# Patient Record
Sex: MALE | Race: WHITE | NOT HISPANIC OR LATINO
[De-identification: names, ages, dates, MRNs, and addresses within clinical notes are randomized per-mention and may not be internally consistent; named-entity substitution may affect disease eponyms.]

---

## 2020-05-08 ENCOUNTER — TRANSCRIPTION ENCOUNTER (OUTPATIENT)
Age: 75
End: 2020-05-08

## 2021-02-04 PROBLEM — Z00.00 ENCOUNTER FOR PREVENTIVE HEALTH EXAMINATION: Status: ACTIVE | Noted: 2021-02-04

## 2021-02-05 ENCOUNTER — APPOINTMENT (OUTPATIENT)
Dept: ORTHOPEDIC SURGERY | Facility: CLINIC | Age: 76
End: 2021-02-05
Payer: MEDICARE

## 2021-02-05 VITALS — BODY MASS INDEX: 25.76 KG/M2 | WEIGHT: 184 LBS | HEIGHT: 71 IN

## 2021-02-05 PROCEDURE — 99205 OFFICE O/P NEW HI 60 MIN: CPT

## 2021-03-16 ENCOUNTER — RESULT REVIEW (OUTPATIENT)
Age: 76
End: 2021-03-16

## 2021-03-16 ENCOUNTER — OUTPATIENT (OUTPATIENT)
Dept: OUTPATIENT SERVICES | Facility: HOSPITAL | Age: 76
LOS: 1 days | End: 2021-03-16
Payer: MEDICARE

## 2021-03-16 VITALS
DIASTOLIC BLOOD PRESSURE: 78 MMHG | HEIGHT: 71 IN | OXYGEN SATURATION: 99 % | WEIGHT: 182.1 LBS | HEART RATE: 75 BPM | TEMPERATURE: 98 F | RESPIRATION RATE: 16 BRPM | SYSTOLIC BLOOD PRESSURE: 134 MMHG

## 2021-03-16 DIAGNOSIS — Z29.9 ENCOUNTER FOR PROPHYLACTIC MEASURES, UNSPECIFIED: ICD-10-CM

## 2021-03-16 DIAGNOSIS — Z98.890 OTHER SPECIFIED POSTPROCEDURAL STATES: Chronic | ICD-10-CM

## 2021-03-16 DIAGNOSIS — M17.11 UNILATERAL PRIMARY OSTEOARTHRITIS, RIGHT KNEE: ICD-10-CM

## 2021-03-16 LAB
BLD GP AB SCN SERPL QL: NEGATIVE — SIGNIFICANT CHANGE UP
MRSA PCR RESULT.: SIGNIFICANT CHANGE UP
RH IG SCN BLD-IMP: POSITIVE — SIGNIFICANT CHANGE UP
S AUREUS DNA NOSE QL NAA+PROBE: SIGNIFICANT CHANGE UP

## 2021-03-16 PROCEDURE — 73700 CT LOWER EXTREMITY W/O DYE: CPT

## 2021-03-16 PROCEDURE — 87641 MR-STAPH DNA AMP PROBE: CPT

## 2021-03-16 PROCEDURE — 86901 BLOOD TYPING SEROLOGIC RH(D): CPT

## 2021-03-16 PROCEDURE — 87640 STAPH A DNA AMP PROBE: CPT

## 2021-03-16 PROCEDURE — 86900 BLOOD TYPING SEROLOGIC ABO: CPT

## 2021-03-16 PROCEDURE — G1004: CPT

## 2021-03-16 PROCEDURE — 86850 RBC ANTIBODY SCREEN: CPT

## 2021-03-16 PROCEDURE — 73700 CT LOWER EXTREMITY W/O DYE: CPT | Mod: 26,RT,MF

## 2021-03-16 PROCEDURE — G0463: CPT

## 2021-03-16 RX ORDER — CHLORHEXIDINE GLUCONATE 213 G/1000ML
1 SOLUTION TOPICAL ONCE
Refills: 0 | Status: DISCONTINUED | OUTPATIENT
Start: 2021-03-30 | End: 2021-03-30

## 2021-03-16 RX ORDER — CEFAZOLIN SODIUM 1 G
2000 VIAL (EA) INJECTION ONCE
Refills: 0 | Status: DISCONTINUED | OUTPATIENT
Start: 2021-03-30 | End: 2021-03-30

## 2021-03-16 RX ORDER — LIDOCAINE HCL 20 MG/ML
0.2 VIAL (ML) INJECTION ONCE
Refills: 0 | Status: DISCONTINUED | OUTPATIENT
Start: 2021-03-30 | End: 2021-04-01

## 2021-03-16 NOTE — H&P PST ADULT - ASSESSMENT
DINHI VTE 2.0 SCORE [CLOT updated 2019]    AGE RELATED RISK FACTORS                                                       MOBILITY RELATED FACTORS  [ ] Age 41-60 years                                            (1 Point)                    [ ] Bed rest                                                        (1 Point)  [ ] Age: 61-74 years                                           (2 Points)                  [ ] Plaster cast                                                   (2 Points)  [ x] Age= 75 years                                              (3 Points)                    [ ] Bed bound for more than 72 hours                 (2 Points)    DISEASE RELATED RISK FACTORS                                               GENDER SPECIFIC FACTORS  [ ] Edema in the lower extremities                       (1 Point)              [ ] Pregnancy                                                     (1 Point)  [ ] Varicose veins                                               (1 Point)                     [ ] Post-partum < 6 weeks                                   (1 Point)             [ ] BMI > 25 Kg/m2                                            (1 Point)                     [ ] Hormonal therapy  or oral contraception          (1 Point)                 [ ] Sepsis (in the previous month)                        (1 Point)               [ ] History of pregnancy complications                 (1 point)  [ ] Pneumonia or serious lung disease                                               [ ] Unexplained or recurrent                     (1 Point)           (in the previous month)                               (1 Point)  [ ] Abnormal pulmonary function test                     (1 Point)                 SURGERY RELATED RISK FACTORS  [ ] Acute myocardial infarction                              (1 Point)               [ ]  Section                                             (1 Point)  [ ] Congestive heart failure (in the previous month)  (1 Point)      [ ] Minor surgery                                                  (1 Point)   [ ] Inflammatory bowel disease                             (1 Point)               [ ] Arthroscopic surgery                                        (2 Points)  [ ] Central venous access                                      (2 Points)                [ ] General surgery lasting more than 45 minutes (2 points)  [ ] Malignancy- Present or previous                   (2 Points)                [ x] Elective arthroplasty                                         (5 points)    [ ] Stroke (in the previous month)                          (5 Points)                                                                                                                                                           HEMATOLOGY RELATED FACTORS                                                 TRAUMA RELATED RISK FACTORS  [ ] Prior episodes of VTE                                     (3 Points)                [ ] Fracture of the hip, pelvis, or leg                       (5 Points)  [ ] Positive family history for VTE                         (3 Points)             [ ] Acute spinal cord injury (in the previous month)  (5 Points)  [ ] Prothrombin 72126 A                                     (3 Points)               [ ] Paralysis  (less than 1 month)                             (5 Points)  [ ] Factor V Leiden                                             (3 Points)                  [ ] Multiple Trauma within 1 month                        (5 Points)  [ ] Lupus anticoagulants                                     (3 Points)                                                           [ ] Anticardiolipin antibodies                               (3 Points)                                                       [ ] High homocysteine in the blood                      (3 Points)                                             [ ] Other congenital or acquired thrombophilia      (3 Points)                                                [ ] Heparin induced thrombocytopenia                  (3 Points)                                     Total Score [    8      ]

## 2021-03-16 NOTE — H&P PST ADULT - PRIMARY CARE PROVIDER
Eusebio Randolph  [phone] preop visit 3/9/2021 Eusebio Randolph  [phone] preop visit 3/9/2021 final note pending

## 2021-03-16 NOTE — H&P PST ADULT - NSICDXPASTSURGICALHX_GEN_ALL_CORE_FT
PAST SURGICAL HISTORY:  History of arthroscopy of shoulder right    History of tonsillectomy and adenoidectomy

## 2021-03-16 NOTE — H&P PST ADULT - NSICDXPROBLEM_GEN_ALL_CORE_FT
PROBLEM DIAGNOSES  Problem: Primary osteoarthritis of right knee  Assessment and Plan: right TKR tommie robot   PST instructions provided, surgical scrub given, patient verbalized understanding.   CBC, BMP, HgA1c done by PCP note and EKG on chart /MMF   Tommie CT  done today   T&S, MRSA collected and sent   Covid PCR 3/27/2021 @ [facility]     Problem: Need for prophylactic measure  Assessment and Plan: The Caprini score indicates that this patient is at high risk for a VTE event (score 6 or greater). Surgical patients in this group will benefit from both pharmacologic prophylaxis and intermittent compression devices.  The surgical team will determine the balance between VTE risk and bleeding risk, and other clinical considerations

## 2021-03-16 NOTE — H&P PST ADULT - HISTORY OF PRESENT ILLNESS
75 yr old male, poor historian,  with HTN, HLD, primary osteoarthritis of right knee, Patient reports "bone on bone" knee , worsening pain and difficulty walking.  Patient presents to PST for scheduled right TKR with Carlos robot on 3/30/2021. Denies fever, chills , no acute complaints. Denies sick contacts. Covid PCR 3/27/2021 @ [facility] . Accompanied by wife. Ambulating without assistance.     "patient's goal is to walk without pain after surgery "

## 2021-03-16 NOTE — H&P PST ADULT - NSICDXPASTMEDICALHX_GEN_ALL_CORE_FT
PAST MEDICAL HISTORY:  History of gout     Primary osteoarthritis of right knee      PAST MEDICAL HISTORY:  History of gout     Hyperlipidemia     Hypertension     Primary osteoarthritis of right knee     Seborrheic keratosis

## 2021-03-16 NOTE — H&P PST ADULT - OTHER CARE PROVIDERS
cardio Dr. Read [phone] , had ECHO 3/10/2021  ( prior cardiologist Dr. Wilbur Villareal [phone]  )

## 2021-03-23 PROBLEM — Z87.39 PERSONAL HISTORY OF OTHER DISEASES OF THE MUSCULOSKELETAL SYSTEM AND CONNECTIVE TISSUE: Chronic | Status: ACTIVE | Noted: 2021-03-16

## 2021-03-23 PROBLEM — E78.5 HYPERLIPIDEMIA, UNSPECIFIED: Chronic | Status: ACTIVE | Noted: 2021-03-16

## 2021-03-23 PROBLEM — M17.11 UNILATERAL PRIMARY OSTEOARTHRITIS, RIGHT KNEE: Chronic | Status: ACTIVE | Noted: 2021-03-16

## 2021-03-23 PROBLEM — I10 ESSENTIAL (PRIMARY) HYPERTENSION: Chronic | Status: ACTIVE | Noted: 2021-03-16

## 2021-03-23 PROBLEM — L82.1 OTHER SEBORRHEIC KERATOSIS: Chronic | Status: ACTIVE | Noted: 2021-03-16

## 2021-03-27 ENCOUNTER — OUTPATIENT (OUTPATIENT)
Dept: OUTPATIENT SERVICES | Facility: HOSPITAL | Age: 76
LOS: 1 days | End: 2021-03-27

## 2021-03-27 DIAGNOSIS — Z11.52 ENCOUNTER FOR SCREENING FOR COVID-19: ICD-10-CM

## 2021-03-27 DIAGNOSIS — Z98.890 OTHER SPECIFIED POSTPROCEDURAL STATES: Chronic | ICD-10-CM

## 2021-03-27 LAB — SARS-COV-2 RNA SPEC QL NAA+PROBE: SIGNIFICANT CHANGE UP

## 2021-03-29 ENCOUNTER — TRANSCRIPTION ENCOUNTER (OUTPATIENT)
Age: 76
End: 2021-03-29

## 2021-03-30 ENCOUNTER — APPOINTMENT (OUTPATIENT)
Dept: ORTHOPEDIC SURGERY | Facility: HOSPITAL | Age: 76
End: 2021-03-30

## 2021-03-30 ENCOUNTER — TRANSCRIPTION ENCOUNTER (OUTPATIENT)
Age: 76
End: 2021-03-30

## 2021-03-30 ENCOUNTER — INPATIENT (INPATIENT)
Facility: HOSPITAL | Age: 76
LOS: 1 days | Discharge: HOME HEALTH | DRG: 470 | End: 2021-04-01
Attending: ORTHOPAEDIC SURGERY | Admitting: ORTHOPAEDIC SURGERY
Payer: MEDICARE

## 2021-03-30 VITALS
WEIGHT: 182.1 LBS | DIASTOLIC BLOOD PRESSURE: 80 MMHG | HEART RATE: 63 BPM | RESPIRATION RATE: 18 BRPM | OXYGEN SATURATION: 99 % | TEMPERATURE: 97 F | HEIGHT: 71 IN | SYSTOLIC BLOOD PRESSURE: 150 MMHG

## 2021-03-30 DIAGNOSIS — M17.11 UNILATERAL PRIMARY OSTEOARTHRITIS, RIGHT KNEE: ICD-10-CM

## 2021-03-30 DIAGNOSIS — Z98.890 OTHER SPECIFIED POSTPROCEDURAL STATES: Chronic | ICD-10-CM

## 2021-03-30 LAB — GLUCOSE BLDC GLUCOMTR-MCNC: 83 MG/DL — SIGNIFICANT CHANGE UP (ref 70–99)

## 2021-03-30 PROCEDURE — 0055T BONE SRGRY CMPTR CT/MRI IMAG: CPT

## 2021-03-30 PROCEDURE — 27447 TOTAL KNEE ARTHROPLASTY: CPT | Mod: RT

## 2021-03-30 PROCEDURE — 20985 CPTR-ASST DIR MS PX: CPT

## 2021-03-30 PROCEDURE — 73560 X-RAY EXAM OF KNEE 1 OR 2: CPT | Mod: 26,RT

## 2021-03-30 RX ORDER — PANTOPRAZOLE SODIUM 20 MG/1
1 TABLET, DELAYED RELEASE ORAL
Qty: 0 | Refills: 0 | DISCHARGE
Start: 2021-03-30

## 2021-03-30 RX ORDER — SODIUM CHLORIDE 9 MG/ML
1000 INJECTION, SOLUTION INTRAVENOUS
Refills: 0 | Status: DISCONTINUED | OUTPATIENT
Start: 2021-03-30 | End: 2021-04-01

## 2021-03-30 RX ORDER — DEXAMETHASONE 0.5 MG/5ML
8 ELIXIR ORAL ONCE
Refills: 0 | Status: COMPLETED | OUTPATIENT
Start: 2021-03-31 | End: 2021-03-31

## 2021-03-30 RX ORDER — SODIUM CHLORIDE 9 MG/ML
3 INJECTION INTRAMUSCULAR; INTRAVENOUS; SUBCUTANEOUS EVERY 8 HOURS
Refills: 0 | Status: DISCONTINUED | OUTPATIENT
Start: 2021-03-30 | End: 2021-03-30

## 2021-03-30 RX ORDER — KETOROLAC TROMETHAMINE 30 MG/ML
15 SYRINGE (ML) INJECTION EVERY 6 HOURS
Refills: 0 | Status: DISCONTINUED | OUTPATIENT
Start: 2021-03-30 | End: 2021-03-31

## 2021-03-30 RX ORDER — OXYCODONE HYDROCHLORIDE 5 MG/1
10 TABLET ORAL
Refills: 0 | Status: DISCONTINUED | OUTPATIENT
Start: 2021-03-30 | End: 2021-04-01

## 2021-03-30 RX ORDER — FLUTICASONE PROPIONATE 50 MCG
1 SPRAY, SUSPENSION NASAL
Qty: 0 | Refills: 0 | DISCHARGE

## 2021-03-30 RX ORDER — ATORVASTATIN CALCIUM 80 MG/1
1 TABLET, FILM COATED ORAL
Qty: 0 | Refills: 0 | DISCHARGE

## 2021-03-30 RX ORDER — TRAMADOL HYDROCHLORIDE 50 MG/1
50 TABLET ORAL EVERY 6 HOURS
Refills: 0 | Status: DISCONTINUED | OUTPATIENT
Start: 2021-03-30 | End: 2021-04-01

## 2021-03-30 RX ORDER — ACETAMINOPHEN 500 MG
3 TABLET ORAL
Qty: 0 | Refills: 0 | DISCHARGE
Start: 2021-03-30

## 2021-03-30 RX ORDER — FLUTICASONE PROPIONATE 50 MCG
1 SPRAY, SUSPENSION NASAL
Refills: 0 | Status: DISCONTINUED | OUTPATIENT
Start: 2021-03-30 | End: 2021-04-01

## 2021-03-30 RX ORDER — ASPIRIN/CALCIUM CARB/MAGNESIUM 324 MG
1 TABLET ORAL
Qty: 0 | Refills: 0 | DISCHARGE
Start: 2021-03-30

## 2021-03-30 RX ORDER — SODIUM CHLORIDE 9 MG/ML
500 INJECTION, SOLUTION INTRAVENOUS ONCE
Refills: 0 | Status: COMPLETED | OUTPATIENT
Start: 2021-03-30 | End: 2021-03-31

## 2021-03-30 RX ORDER — CEFAZOLIN SODIUM 1 G
2000 VIAL (EA) INJECTION EVERY 8 HOURS
Refills: 0 | Status: COMPLETED | OUTPATIENT
Start: 2021-03-30 | End: 2021-03-31

## 2021-03-30 RX ORDER — ASPIRIN/CALCIUM CARB/MAGNESIUM 324 MG
325 TABLET ORAL
Refills: 0 | Status: DISCONTINUED | OUTPATIENT
Start: 2021-03-30 | End: 2021-04-01

## 2021-03-30 RX ORDER — LISINOPRIL 2.5 MG/1
10 TABLET ORAL DAILY
Refills: 0 | Status: DISCONTINUED | OUTPATIENT
Start: 2021-03-30 | End: 2021-04-01

## 2021-03-30 RX ORDER — ACETAMINOPHEN 500 MG
1000 TABLET ORAL ONCE
Refills: 0 | Status: COMPLETED | OUTPATIENT
Start: 2021-03-30 | End: 2021-03-30

## 2021-03-30 RX ORDER — MAGNESIUM HYDROXIDE 400 MG/1
30 TABLET, CHEWABLE ORAL DAILY
Refills: 0 | Status: DISCONTINUED | OUTPATIENT
Start: 2021-03-30 | End: 2021-04-01

## 2021-03-30 RX ORDER — PANTOPRAZOLE SODIUM 20 MG/1
40 TABLET, DELAYED RELEASE ORAL
Refills: 0 | Status: DISCONTINUED | OUTPATIENT
Start: 2021-03-30 | End: 2021-04-01

## 2021-03-30 RX ORDER — FENTANYL CITRATE 50 UG/ML
25 INJECTION INTRAVENOUS
Refills: 0 | Status: DISCONTINUED | OUTPATIENT
Start: 2021-03-30 | End: 2021-03-30

## 2021-03-30 RX ORDER — ONDANSETRON 8 MG/1
4 TABLET, FILM COATED ORAL EVERY 6 HOURS
Refills: 0 | Status: DISCONTINUED | OUTPATIENT
Start: 2021-03-30 | End: 2021-04-01

## 2021-03-30 RX ORDER — PANTOPRAZOLE SODIUM 20 MG/1
40 TABLET, DELAYED RELEASE ORAL ONCE
Refills: 0 | Status: COMPLETED | OUTPATIENT
Start: 2021-03-30 | End: 2021-03-30

## 2021-03-30 RX ORDER — HYDROMORPHONE HYDROCHLORIDE 2 MG/ML
0.5 INJECTION INTRAMUSCULAR; INTRAVENOUS; SUBCUTANEOUS ONCE
Refills: 0 | Status: DISCONTINUED | OUTPATIENT
Start: 2021-03-30 | End: 2021-04-01

## 2021-03-30 RX ORDER — TRAMADOL HYDROCHLORIDE 50 MG/1
50 TABLET ORAL ONCE
Refills: 0 | Status: DISCONTINUED | OUTPATIENT
Start: 2021-03-30 | End: 2021-03-30

## 2021-03-30 RX ORDER — ONDANSETRON 8 MG/1
4 TABLET, FILM COATED ORAL ONCE
Refills: 0 | Status: DISCONTINUED | OUTPATIENT
Start: 2021-03-30 | End: 2021-03-30

## 2021-03-30 RX ORDER — QUINAPRIL HYDROCHLORIDE 40 MG/1
1 TABLET, FILM COATED ORAL
Qty: 0 | Refills: 0 | DISCHARGE

## 2021-03-30 RX ORDER — OXYCODONE HYDROCHLORIDE 5 MG/1
5 TABLET ORAL
Refills: 0 | Status: DISCONTINUED | OUTPATIENT
Start: 2021-03-30 | End: 2021-04-01

## 2021-03-30 RX ORDER — ACETAMINOPHEN 500 MG
975 TABLET ORAL EVERY 8 HOURS
Refills: 0 | Status: DISCONTINUED | OUTPATIENT
Start: 2021-03-31 | End: 2021-04-01

## 2021-03-30 RX ORDER — SODIUM CHLORIDE 9 MG/ML
500 INJECTION, SOLUTION INTRAVENOUS ONCE
Refills: 0 | Status: COMPLETED | OUTPATIENT
Start: 2021-03-30 | End: 2021-03-30

## 2021-03-30 RX ORDER — ATORVASTATIN CALCIUM 80 MG/1
40 TABLET, FILM COATED ORAL AT BEDTIME
Refills: 0 | Status: DISCONTINUED | OUTPATIENT
Start: 2021-03-30 | End: 2021-04-01

## 2021-03-30 RX ADMIN — Medication 150 MILLIGRAM(S): at 11:34

## 2021-03-30 RX ADMIN — Medication 100 MILLIGRAM(S): at 19:49

## 2021-03-30 RX ADMIN — FENTANYL CITRATE 25 MICROGRAM(S): 50 INJECTION INTRAVENOUS at 16:45

## 2021-03-30 RX ADMIN — SODIUM CHLORIDE 100 MILLILITER(S): 9 INJECTION, SOLUTION INTRAVENOUS at 16:13

## 2021-03-30 RX ADMIN — TRAMADOL HYDROCHLORIDE 50 MILLIGRAM(S): 50 TABLET ORAL at 11:34

## 2021-03-30 RX ADMIN — Medication 1000 MILLIGRAM(S): at 20:20

## 2021-03-30 RX ADMIN — Medication 15 MILLIGRAM(S): at 19:49

## 2021-03-30 RX ADMIN — Medication 400 MILLIGRAM(S): at 19:49

## 2021-03-30 RX ADMIN — ATORVASTATIN CALCIUM 40 MILLIGRAM(S): 80 TABLET, FILM COATED ORAL at 23:08

## 2021-03-30 RX ADMIN — PANTOPRAZOLE SODIUM 40 MILLIGRAM(S): 20 TABLET, DELAYED RELEASE ORAL at 11:35

## 2021-03-30 RX ADMIN — Medication 325 MILLIGRAM(S): at 17:37

## 2021-03-30 RX ADMIN — Medication 15 MILLIGRAM(S): at 20:20

## 2021-03-30 RX ADMIN — FENTANYL CITRATE 25 MICROGRAM(S): 50 INJECTION INTRAVENOUS at 16:30

## 2021-03-30 RX ADMIN — SODIUM CHLORIDE 3 MILLILITER(S): 9 INJECTION INTRAMUSCULAR; INTRAVENOUS; SUBCUTANEOUS at 11:35

## 2021-03-30 RX ADMIN — SODIUM CHLORIDE 500 MILLILITER(S): 9 INJECTION, SOLUTION INTRAVENOUS at 18:28

## 2021-03-30 RX ADMIN — Medication 1 SPRAY(S): at 23:08

## 2021-03-30 RX ADMIN — SODIUM CHLORIDE 500 MILLILITER(S): 9 INJECTION, SOLUTION INTRAVENOUS at 15:39

## 2021-03-30 NOTE — PHYSICAL THERAPY INITIAL EVALUATION ADULT - ADDITIONAL COMMENTS
pt uncertain historian. pt reports he lives with spouse in a private home with steps to enter and a flight of steps inside (pt uncertain how many steps and if he has a railing). pt denies ambulating with a device and/or owning any devices prior to admission. pt uncertain historian. spouse reports pt lives with her in a private home with 6 steps to enter (+handrail) and a flight of steps inside (+handrail). pt was independent with ADLs and functional mobility prior to admission. pt does not own any DME.

## 2021-03-30 NOTE — DISCHARGE NOTE PROVIDER - NSDCFUADDAPPT_GEN_ALL_CORE_FT
Follow up with Dr Vargas in 2 WEEKS for dressing REMOVAL, wound check, and staple/suture removal (if applicable)   Follow up with your private internist / PMD in 4-6 weeks re: general checkup (and possible medication adjustment  Please call for an appointment

## 2021-03-30 NOTE — CHART NOTE - NSCHARTNOTEFT_GEN_A_CORE
POC    Subjective:  Patient seen resting in bed in the PACU. Notes the anesthetic block is wearing off and he is starting to feel some knee pain, currently rating it a 4/10. Denies chest pain, shortness of breath, dizziness, lightheadedness, nausea, or vomiting.    Objective:    PE:  HEENT: responsive, in no acute distress  Cardio: normal S1S2, normal rate and rhythm  Pulm: non-labored breathing, clear breath sounds bilaterally at lung apices  Abdomen: soft and non-tender, (+) bowel sounds x4  Extremities:  RLE:  -knee dressing clean, dry, and intact  -left SCD in place, calves soft and non-tender bilaterally  -motor strength and senses intact bilaterally  -DF/PF 5/5  -EHL/FHL 5/5  -DP and PT pulses +2 bilaterally    Vital Signs Last 24 Hrs  T(F): 97.2 (30 Mar 2021 16:00)  HR: 72 (30 Mar 2021 16:30)   BP: 133/73 (30 Mar 2021 16:30)   RR: 16 (30 Mar 2021 16:30)   SpO2: 95% (30 Mar 2021 16:30)     Post-op Imaging: R Knee x-ray  IMPRESSION:  Unconstrained right total knee prosthesis implanted.  Intact and aligned hardware and no periprosthetic fractures.  Postoperative soft tissue changes.    Assessment:  Patient is a 76 y/o male s/p right TKA.    Plan:  -RLE/LLE weight-bearing as tolerated, PT evaluation tomorrow  -pain control IV Tylenol 1000mg/100ml, IV Fentanyl 25mcg every 5 minutes prn, oxycodone IR 5mg for moderate 10mg for severe every 3 hours prn  -no Celebrex 2/2 sulfa allergy  -DVT prophylaxis Aspirin 325mg BID, LLE SCD  -follow up AM labs - CBC, BMP  -discharge PACU to floor    TIFFANI Noel  Ortho Pager [phone]

## 2021-03-30 NOTE — DISCHARGE NOTE PROVIDER - NSDCFUADDINST_GEN_ALL_CORE_FT
1. Keep Aquacel dressing clean and dry   1a. ice to affected incision every 4-6 hours x 72 hours   1b. elevate affected extremity when @ rest   1c.Range of motion exercises encouraged   2. Continue ECOTRIN 325 mg by mouth 2x / day (at breakfast and at dinner) for a total of 6WEEKS   3. sponge bath only until OK w/ Surgeon  Call MD for excessive pain, persistent fevers, pain NOT relieved by medications.  Do not drive or lift any heavy objects       COVID REMINDER: You are being discharged from the hospital. Please keep in mind that the CORONAVIRUS is still in our community.   So, try to restrict activities outside of your home except for getting medical care and simple errands [until seen by your Surgeon]. Call ahead before visiting your doctor.  Wear a facemask when you are outside and around other people. Cover your cough and sneezes.  Clean your hands often.  Try to avoid sharing personal household items.  Clean all frequently touched surfaces daily.  1. Keep Aquacel dressing clean and dry   1a. ice to affected incision every 4-6 hours x 72 hours   1b. elevate affected extremity when @ rest   1c.Range of motion exercises encouraged .  Weight bearing as tolerated right lower extremity.  2. Continue ECOTRIN 325 mg by mouth 2x / day (at breakfast and at dinner) for a total of 6WEEKS   3. sponge bath only until OK w/ Surgeon  Call MD for excessive pain, persistent fevers, pain NOT relieved by medications.  Do not drive or lift any heavy objects       COVID REMINDER: You are being discharged from the hospital. Please keep in mind that the CORONAVIRUS is still in our community.   So, try to restrict activities outside of your home except for getting medical care and simple errands [until seen by your Surgeon]. Call ahead before visiting your doctor.  Wear a facemask when you are outside and around other people. Cover your cough and sneezes.  Clean your hands often.  Try to avoid sharing personal household items.  Clean all frequently touched surfaces daily.

## 2021-03-30 NOTE — PATIENT PROFILE ADULT - MONEY FOR FOOD
Detail Level: Zone Topical Steroids Counseling: I discussed with the patient that prolonged use of topical steroids can result in the increased appearance of superficial blood vessels (telangiectasias), lightening (hypopigmentation) and thinning of the skin (atrophy).  Patient understands to avoid using high potency steroids in skin folds, the groin or the face.  The patient verbalized understanding of the proper use and possible adverse effects of topical steroids.  All of the patient's questions and concerns were addressed. no

## 2021-03-30 NOTE — DISCHARGE NOTE PROVIDER - CARE PROVIDER_API CALL
Rosalva Vargas)  Orthopaedic Surgery  [address], [address]  [place] [address]  Phone: [phone]  Fax: [phone]  Follow Up Time:

## 2021-03-30 NOTE — DISCHARGE NOTE PROVIDER - NSDCMRMEDTOKEN_GEN_ALL_CORE_FT
acetaminophen 325 mg oral tablet: 3 tab(s) orally every 8 hours x ONE (1) WEEK, then as needed   aspirin 325 mg oral tablet: 1 tab(s) orally 2 times a day x 6 WEEKS Total (blood thinner) then STOP   atorvastatin 40 mg oral tablet: 1 tab(s) orally once a day  Flonase 50 mcg/inh nasal spray: 1 spray(s) nasal once a day  Multiple Vitamins oral tablet: 1 tab(s) orally once a day  pantoprazole 40 mg oral delayed release tablet: 1 tab(s) orally once a day (before a meal)  quinapril 10 mg oral tablet: 1 tab(s) orally once a day   3-in-1 commode: Dx: s/p Right TKA  GOMEZ: 99 months  acetaminophen 325 mg oral tablet: 3 tab(s) orally every 8 hours x ONE (1) WEEK, then as needed for mild pain  (Over the counter)  aspirin 325 mg oral tablet: 1 tab(s) orally 2 times a day x 6 WEEKS Total (blood thinner) then STOP   atorvastatin 40 mg oral tablet: 1 tab(s) orally once a day  Flonase 50 mcg/inh nasal spray: 1 spray(s) nasal once a day  Multiple Vitamins oral tablet: 1 tab(s) orally once a day  oxyCODONE 5 mg oral tablet: 1 tab(s) orally every 4 to 6 hours, As Needed -Severe Pain MDD:6  pantoprazole 40 mg oral delayed release tablet: 1 tab(s) orally once a day (before a meal)  quinapril 10 mg oral tablet: 1 tab(s) orally once a day  Rolling Walker: Dx: s/p Right TKA  GOMEZ: 99 months  traMADol 50 mg oral tablet: 1 tab(s) orally every 6 hours, As needed, Moderate Pain MDD:4

## 2021-03-30 NOTE — DISCHARGE NOTE PROVIDER - HOSPITAL COURSE
History of Present Illness:  History of Present Illness    75 yr old male, poor historian,  with HTN, HLD, primary osteoarthritis of right knee, Patient reports "bone on bone" knee , worsening pain and difficulty walking.  Patient presents to PST for scheduled right TKR with Carlos robot on 3/30/2021. Denies fever, chills , no acute complaints. Denies sick contacts. Covid PCR 3/27/2021 @ [facility] . Accompanied by wife. Ambulating without assistance.     "patient's goal is to walk without pain after surgery "     Allergies/Medications:   Allergies:        Allergies:  	sulfa drugs: Drug Category, Anaphylaxis    Home Medications:   * Patient Currently Takes Medications as of 16-Mar-2021 10:25 documented in Structured Notes  · 	atorvastatin 40 mg oral tablet: Last Dose Taken:  , 1 tab(s) orally once a day  · 	quinapril 10 mg oral tablet: Last Dose Taken:  , 1 tab(s) orally once a day  · 	Flonase 50 mcg/inh nasal spray: Last Dose Taken:  , 1 spray(s) nasal once a day    PMH/PSH/FH/SH:    Past Medical, Past Surgical, and Family History:  PAST MEDICAL HISTORY:  History of gout     Hyperlipidemia     Hypertension     Primary osteoarthritis of right knee     Seborrheic keratosis.     PAST SURGICAL HISTORY:  History of arthroscopy of shoulder right    History of tonsillectomy and adenoidectomy.      Hospital Course:   3/30:  Pt admitted and underwent L TKA ( robot-assisted) w/ Dr Vargas. No complications noted. Eval by by PT:  WBAT RLE  3/31:             GOC: " Walk with out knee pain after surgery"  History of Present Illness:  History of Present Illness    75 yr old male, poor historian,  with HTN, HLD, primary osteoarthritis of right knee, Patient reports "bone on bone" knee , worsening pain and difficulty walking.  Patient presents to PST for scheduled right TKR with Carlos robot on 3/30/2021. Denies fever, chills , no acute complaints. Denies sick contacts. Covid PCR 3/27/2021 @ [facility] . Accompanied by wife. Ambulating without assistance.     "patient's goal is to walk without pain after surgery "     Allergies/Medications:   Allergies:        Allergies:  	sulfa drugs: Drug Category, Anaphylaxis    Home Medications:   * Patient Currently Takes Medications as of 16-Mar-2021 10:25 documented in Structured Notes  · 	atorvastatin 40 mg oral tablet: Last Dose Taken:  , 1 tab(s) orally once a day  · 	quinapril 10 mg oral tablet: Last Dose Taken:  , 1 tab(s) orally once a day  · 	Flonase 50 mcg/inh nasal spray: Last Dose Taken:  , 1 spray(s) nasal once a day    PMH/PSH/FH/SH:    Past Medical, Past Surgical, and Family History:  PAST MEDICAL HISTORY:  History of gout   Hyperlipidemia   Hypertension   Primary osteoarthritis of right knee   Seborrheic keratosis.     PAST SURGICAL HISTORY:  History of arthroscopy of shoulder right  History of tonsillectomy and adenoidectomy.    Hospital Course:   3/30:  Pt admitted and underwent L TKA ( robot-assisted) w/ Dr Vargas. No complications noted.   3/31:  Evaluated by PT, PT, recommended for subacute rehab.  Remain of stay unremarkable, and patient discharged to Subacute rehab.    GOC: " Walk with out knee pain after surgery"  History of Present Illness:  History of Present Illness    75 yr old male, poor historian,  with HTN, HLD, primary osteoarthritis of right knee, Patient reports "bone on bone" knee , worsening pain and difficulty walking.  Patient presents to PST for scheduled right TKR with Carlos robot on 3/30/2021. Denies fever, chills , no acute complaints. Denies sick contacts. Covid PCR 3/27/2021 @ [facility] . Accompanied by wife. Ambulating without assistance.     "patient's goal is to walk without pain after surgery "     Allergies/Medications:   Allergies:        Allergies:  	sulfa drugs: Drug Category, Anaphylaxis    Home Medications:   * Patient Currently Takes Medications as of 16-Mar-2021 10:25 documented in Structured Notes  · 	atorvastatin 40 mg oral tablet: Last Dose Taken:  , 1 tab(s) orally once a day  · 	quinapril 10 mg oral tablet: Last Dose Taken:  , 1 tab(s) orally once a day  · 	Flonase 50 mcg/inh nasal spray: Last Dose Taken:  , 1 spray(s) nasal once a day    PMH/PSH/FH/SH:    Past Medical, Past Surgical, and Family History:  PAST MEDICAL HISTORY:  History of gout   Hyperlipidemia   Hypertension   Primary osteoarthritis of right knee   Seborrheic keratosis.     PAST SURGICAL HISTORY:  History of arthroscopy of shoulder right  History of tonsillectomy and adenoidectomy.    Hospital Course:   3/30:  Pt admitted and underwent L TKA ( robot-assisted) w/ Dr Vargas. No complications noted.   4/1:  Evaluated by PT, PT, recommended home with home PT.  Remain of stay unremarkable, and patient discharged to Home.    Patient received Covid19 vaccine on this admission on 4/1/2021. Remainder of hospital stay unremarkable    GOC: " Walk with out knee pain after surgery"  History of Present Illness:  History of Present Illness    75 yr old male, poor historian,  with HTN, HLD, primary osteoarthritis of right knee, Patient reports "bone on bone" knee , worsening pain and difficulty walking.  Patient presents to PST for scheduled right TKR with Carlos robot on 3/30/2021. Denies fever, chills , no acute complaints. Denies sick contacts. Covid PCR 3/27/2021 @ [facility] . Accompanied by wife. Ambulating without assistance.     "patient's goal is to walk without pain after surgery "     Allergies/Medications:   Allergies:        Allergies:  	sulfa drugs: Drug Category, Anaphylaxis    Home Medications:   * Patient Currently Takes Medications as of 16-Mar-2021 10:25 documented in Structured Notes  · 	atorvastatin 40 mg oral tablet: Last Dose Taken:  , 1 tab(s) orally once a day  · 	quinapril 10 mg oral tablet: Last Dose Taken:  , 1 tab(s) orally once a day  · 	Flonase 50 mcg/inh nasal spray: Last Dose Taken:  , 1 spray(s) nasal once a day    PMH/PSH/FH/SH:    Past Medical, Past Surgical, and Family History:  PAST MEDICAL HISTORY:  History of gout   Hyperlipidemia   Hypertension   Primary osteoarthritis of right knee   Seborrheic keratosis.     PAST SURGICAL HISTORY:  History of arthroscopy of shoulder right  History of tonsillectomy and adenoidectomy.    Hospital Course:   3/30:  Pt admitted and underwent L TKA ( robot-assisted) w/ Dr Vargas. No complications noted.   4/1:  Evaluated by PT, PT, recommended home with home PT.  Remain of stay unremarkable, and patient discharged to Home.    Patient received Covid19 vaccine on this admission on 4/1/2021. Remainder of hospital stay unremarkable  Follow up Dr Vargas in office                          14.3   13.64 )-----------( 279      ( 31 Mar 2021 06:45 )             42.9       GOC: " Walk with out knee pain after surgery"

## 2021-03-30 NOTE — PHYSICAL THERAPY INITIAL EVALUATION ADULT - BALANCE DISTURBANCE, SYSTEM IMPAIRMENT CONTRIBUTE, REHAB EVAL
[address], [place]  Authorization for Invasive Procedures  1.  I hereby authorize Lexi Garcia, my physician and whomever may be designated as the doctor's assistant, to perform the following operation and/or procedure:  Cardiac ca 4. Should the need arise during my operation or immediate post-operative period; I also consent to the administration of blood and/or blood products.  Further, I understand that despite careful testing and screening of blood and blood products, I may still 9. Patients having a sterilization procedure: I understand that if the procedure is successful the results will be permanent and it will therefore be impossible for me to inseminate, conceive or bear children.  I also understand that the procedure is intend musculoskeletal

## 2021-03-30 NOTE — PHYSICAL THERAPY INITIAL EVALUATION ADULT - GENERAL OBSERVATIONS, REHAB EVAL
pt nallely 60 min eval fair. pt rec'd in bed, peripheral IV line, premedicated, spouse at bedside, NAD. pt agreed to session. pt appeared Stebbins. pt A&Ox4, but spouse reports h/o short term memory loss. orthostatics assessed (see vitals).

## 2021-03-30 NOTE — PHYSICAL THERAPY INITIAL EVALUATION ADULT - PERTINENT HX OF CURRENT PROBLEM, REHAB EVAL
76 y/o M with h/o HTN, HLD, OA of R knee, now p/w worsening pain and difficulty walking. pt now presents s/p R TKR.

## 2021-03-31 ENCOUNTER — TRANSCRIPTION ENCOUNTER (OUTPATIENT)
Age: 76
End: 2021-03-31

## 2021-03-31 DIAGNOSIS — Z96.651 PRESENCE OF RIGHT ARTIFICIAL KNEE JOINT: ICD-10-CM

## 2021-03-31 LAB
ANION GAP SERPL CALC-SCNC: 12 MMOL/L — SIGNIFICANT CHANGE UP (ref 5–17)
BUN SERPL-MCNC: 20 MG/DL — SIGNIFICANT CHANGE UP (ref 7–23)
CALCIUM SERPL-MCNC: 8.4 MG/DL — SIGNIFICANT CHANGE UP (ref 8.4–10.5)
CHLORIDE SERPL-SCNC: 107 MMOL/L — SIGNIFICANT CHANGE UP (ref 96–108)
CO2 SERPL-SCNC: 21 MMOL/L — LOW (ref 22–31)
COVID-19 SPIKE DOMAIN AB INTERP: NEGATIVE — SIGNIFICANT CHANGE UP
COVID-19 SPIKE DOMAIN ANTIBODY RESULT: 0.4 U/ML — SIGNIFICANT CHANGE UP
CREAT SERPL-MCNC: 1.06 MG/DL — SIGNIFICANT CHANGE UP (ref 0.5–1.3)
GLUCOSE SERPL-MCNC: 152 MG/DL — HIGH (ref 70–99)
HCT VFR BLD CALC: 42.9 % — SIGNIFICANT CHANGE UP (ref 39–50)
HGB BLD-MCNC: 14.3 G/DL — SIGNIFICANT CHANGE UP (ref 13–17)
MCHC RBC-ENTMCNC: 30 PG — SIGNIFICANT CHANGE UP (ref 27–34)
MCHC RBC-ENTMCNC: 33.3 GM/DL — SIGNIFICANT CHANGE UP (ref 32–36)
MCV RBC AUTO: 90.1 FL — SIGNIFICANT CHANGE UP (ref 80–100)
NRBC # BLD: 0 /100 WBCS — SIGNIFICANT CHANGE UP (ref 0–0)
PLATELET # BLD AUTO: 279 K/UL — SIGNIFICANT CHANGE UP (ref 150–400)
POTASSIUM SERPL-MCNC: 4.2 MMOL/L — SIGNIFICANT CHANGE UP (ref 3.5–5.3)
POTASSIUM SERPL-SCNC: 4.2 MMOL/L — SIGNIFICANT CHANGE UP (ref 3.5–5.3)
RBC # BLD: 4.76 M/UL — SIGNIFICANT CHANGE UP (ref 4.2–5.8)
RBC # FLD: 12.6 % — SIGNIFICANT CHANGE UP (ref 10.3–14.5)
SARS-COV-2 IGG+IGM SERPL QL IA: 0.4 U/ML — SIGNIFICANT CHANGE UP
SARS-COV-2 IGG+IGM SERPL QL IA: NEGATIVE — SIGNIFICANT CHANGE UP
SODIUM SERPL-SCNC: 140 MMOL/L — SIGNIFICANT CHANGE UP (ref 135–145)
WBC # BLD: 13.64 K/UL — HIGH (ref 3.8–10.5)
WBC # FLD AUTO: 13.64 K/UL — HIGH (ref 3.8–10.5)

## 2021-03-31 PROCEDURE — 99231 SBSQ HOSP IP/OBS SF/LOW 25: CPT

## 2021-03-31 RX ADMIN — Medication 975 MILLIGRAM(S): at 05:26

## 2021-03-31 RX ADMIN — PANTOPRAZOLE SODIUM 40 MILLIGRAM(S): 20 TABLET, DELAYED RELEASE ORAL at 05:25

## 2021-03-31 RX ADMIN — Medication 975 MILLIGRAM(S): at 05:56

## 2021-03-31 RX ADMIN — Medication 15 MILLIGRAM(S): at 02:15

## 2021-03-31 RX ADMIN — Medication 101.6 MILLIGRAM(S): at 05:26

## 2021-03-31 RX ADMIN — LISINOPRIL 10 MILLIGRAM(S): 2.5 TABLET ORAL at 05:25

## 2021-03-31 RX ADMIN — Medication 15 MILLIGRAM(S): at 01:58

## 2021-03-31 RX ADMIN — Medication 15 MILLIGRAM(S): at 13:25

## 2021-03-31 RX ADMIN — Medication 975 MILLIGRAM(S): at 21:50

## 2021-03-31 RX ADMIN — Medication 100 MILLIGRAM(S): at 04:37

## 2021-03-31 RX ADMIN — Medication 1 TABLET(S): at 13:25

## 2021-03-31 RX ADMIN — ATORVASTATIN CALCIUM 40 MILLIGRAM(S): 80 TABLET, FILM COATED ORAL at 21:20

## 2021-03-31 RX ADMIN — Medication 1 SPRAY(S): at 05:25

## 2021-03-31 RX ADMIN — Medication 325 MILLIGRAM(S): at 17:33

## 2021-03-31 RX ADMIN — Medication 15 MILLIGRAM(S): at 08:06

## 2021-03-31 RX ADMIN — Medication 15 MILLIGRAM(S): at 07:36

## 2021-03-31 RX ADMIN — Medication 975 MILLIGRAM(S): at 21:20

## 2021-03-31 RX ADMIN — Medication 325 MILLIGRAM(S): at 05:25

## 2021-03-31 RX ADMIN — SODIUM CHLORIDE 500 MILLILITER(S): 9 INJECTION, SOLUTION INTRAVENOUS at 05:31

## 2021-03-31 NOTE — PROGRESS NOTE ADULT - ASSESSMENT
Assessment:  Patient is a 76 y/o male POD#1 for right total knee replacement.    Plan:  -RLE weight-bearing as tolerated, PT today  -DVT prophylaxis Aspirin 325mg BID  -Pain management Tylenol 975mg q8, Tordol 15mg q6, Tramadol 60mg q6 for mild pain, Oxycodone 6mg q3 for moderate pain, Oxycodone 10mg q3 for severe pain (although sulfa allergy, no issue with Advil in the past, ok to continue Tordol)  -Follow up AM labs - CBC, BMP  -Discharge planning TBD      EFRAIN Noel-S  Ortho Pager [phone]   Assessment:  Patient is a 76 y/o male POD#1 for right total knee replacement, recovering well without complaints.

## 2021-03-31 NOTE — OCCUPATIONAL THERAPY INITIAL EVALUATION ADULT - IMPAIRED TRANSFERS: SIT/STAND, REHAB EVAL
impaired balance/impaired coordination/decreased strength impaired balance/cognition/impaired coordination/pain/decreased strength

## 2021-03-31 NOTE — OCCUPATIONAL THERAPY INITIAL EVALUATION ADULT - PHYSICAL ASSIST/NONPHYSICAL ASSIST: TOILET, REHAB EVAL
cues for use of grab bars/verbal cues cues for use of grab bars/verbal cues/nonverbal cues (demo/gestures)/1 person assist

## 2021-03-31 NOTE — OCCUPATIONAL THERAPY INITIAL EVALUATION ADULT - PERTINENT HX OF CURRENT PROBLEM, REHAB EVAL
76 yo M, poor historian, with primary osteoarthritis of right knee, Patient reports "bone on bone" knee , worsening pain and difficulty walking.  Patient presents to PST for scheduled right TKR with Carlos robot on 3/30/2021. Denies fever, chills , no acute complaints. (see below)

## 2021-03-31 NOTE — OCCUPATIONAL THERAPY INITIAL EVALUATION ADULT - PLANNED THERAPY INTERVENTIONS, OT EVAL
ADL retraining/balance training/transfer training ADL retraining/balance training/bed mobility training/transfer training

## 2021-03-31 NOTE — PROGRESS NOTE ADULT - SUBJECTIVE AND OBJECTIVE BOX
Subjective:  Patient seen resting comfortably in bed with ice over knee. States knee pain is about the same as yesterday and has not improved. Denies chest pain, shortness of breath, nausea, or vomiting.    Objective:  PE:  HEENT: responsive, in no acute distress  RLE:  -knee Aquacel dressing clean, dry, and intact  -bilateral calves soft and non-tender, left SCD in place  -motor and sensation intact bilaterally  -DF/PF 5/5 bilaterally  -EHL/FHL 5/5 bilaterally  -DP pulse +2 bilaterally    Vital Signs Last 24 Hrs  T(C): 36.4 (31 Mar 2021 04:35),   HR: 100 (31 Mar 2021 04:35)   BP: 133/77 (31 Mar 2021 04:35)   RR: 16 (31 Mar 2021 04:35)  SpO2: 98% (31 Mar 2021 04:35)     Subjective:  Patient seen resting comfortably in bed with ice over knee. States knee pain is about the same as yesterday (4/10) and has not improved. Denies chest pain, shortness of breath, nausea, or vomiting.    Objective:  PE:  HEENT: Awake/alert resting in bed,, in no acute distress  RLE:  -knee Aquacel dressing clean, dry, and intact, Soft/compressible compartments  -bilateral calves soft and non-tender, left SCD in place  -sensation intact bilaterally  -DF/PF 5/5 bilaterally  -EHL/FHL 5/5 bilaterally  -DP pulse +2 bilaterally    Vital Signs Last 24 Hrs  T(C): 36.4 (31 Mar 2021 04:35),   HR: 100 (31 Mar 2021 04:35)   BP: 133/77 (31 Mar 2021 04:35)   RR: 16 (31 Mar 2021 04:35)  SpO2: 98% (31 Mar 2021 04:35)

## 2021-03-31 NOTE — OCCUPATIONAL THERAPY INITIAL EVALUATION ADULT - ANTICIPATED DISCHARGE DISPOSITION, OT EVAL
[facility]LL[facility]/rehabilitation facility Home OT to assess safety, ADLs and functional mobility. Supervision assist for all functional activities. Wife able to provide assist/home w/ OT/rehabilitation facility

## 2021-03-31 NOTE — OCCUPATIONAL THERAPY INITIAL EVALUATION ADULT - ADDITIONAL COMMENTS
(continued from above) X-ray R Knee (3/30): Unconstrained right total knee prosthesis implanted. Intact and aligned hardware and no periprosthetic fractures.  CT R Knee (3/16): Moderate medial compartment arthrosis of the right knee.

## 2021-03-31 NOTE — OCCUPATIONAL THERAPY INITIAL EVALUATION ADULT - MUSCLE TONE ASSESSMENT, REHAB EVAL
BUE and LLE 3+/5, unable to assess RLE secondary to TKR/bilateral upper extremities/bilateral lower extremities

## 2021-03-31 NOTE — PROGRESS NOTE ADULT - PROBLEM SELECTOR PLAN 1
Plan:  -RLE weight-bearing as tolerated, PT today  -DVT prophylaxis Aspirin 325mg BID  -Pain management Tylenol 975mg q8, Tordol 15mg q6, Tramadol 60mg q6 for mild pain, Oxycodone 6mg q3 for moderate pain, Oxycodone 10mg q3 for severe pain (although sulfa allergy, no issue with Advil in the past -continue Tordol)  -Follow up AM labs - CBC, BMP  -Discharge planning TBD      SILVIO NoelS  Ortho Pager [phone]    I have reviewed the student's note, examined the patient, and changed the note where applicable, and I agree with the above note.    BALBINA Galvez PA-C  #[id number]

## 2021-03-31 NOTE — OCCUPATIONAL THERAPY INITIAL EVALUATION ADULT - PHYSICAL ASSIST/NONPHYSICAL ASSIST: SIT/STAND, REHAB EVAL
verbal cues for hand/foot placement/verbal cues verbal cues for hand/foot placement/verbal cues/nonverbal cues (demo/gestures)/1 person assist

## 2021-03-31 NOTE — OCCUPATIONAL THERAPY INITIAL EVALUATION ADULT - RANGE OF MOTION EXAMINATION, LOWER EXTREMITY
Left LE Active ROM was WFL (within functional limits)/Right LE Active ROM was WFL   (within functional limits) 96.7

## 2021-03-31 NOTE — OCCUPATIONAL THERAPY INITIAL EVALUATION ADULT - DIAGNOSIS, OT EVAL
Patient presents with deficits in strength, balance, coordination and endurance impacting ADL participation and functional mobility Patient presents with deficits in strength, balance, coordination, cognition and endurance impacting ADL participation and functional mobility

## 2021-03-31 NOTE — DISCHARGE NOTE NURSING/CASE MANAGEMENT/SOCIAL WORK - PATIENT PORTAL LINK FT
You can access the FollowMyHealth Patient Portal offered by [facility] by registering at the following website: [email or web address]. By joining [facility]’s FollowMyHealth portal, you will also be able to view your health information using other applications (apps) compatible with our system.

## 2021-04-01 VITALS
RESPIRATION RATE: 16 BRPM | DIASTOLIC BLOOD PRESSURE: 67 MMHG | OXYGEN SATURATION: 99 % | SYSTOLIC BLOOD PRESSURE: 110 MMHG | HEART RATE: 88 BPM | TEMPERATURE: 98 F

## 2021-04-01 PROCEDURE — U0003: CPT

## 2021-04-01 PROCEDURE — 94640 AIRWAY INHALATION TREATMENT: CPT

## 2021-04-01 PROCEDURE — 86769 SARS-COV-2 COVID-19 ANTIBODY: CPT

## 2021-04-01 PROCEDURE — 82962 GLUCOSE BLOOD TEST: CPT

## 2021-04-01 PROCEDURE — 80048 BASIC METABOLIC PNL TOTAL CA: CPT

## 2021-04-01 PROCEDURE — 97530 THERAPEUTIC ACTIVITIES: CPT

## 2021-04-01 PROCEDURE — 0031A: CPT

## 2021-04-01 PROCEDURE — S2900: CPT

## 2021-04-01 PROCEDURE — 85027 COMPLETE CBC AUTOMATED: CPT

## 2021-04-01 PROCEDURE — 97535 SELF CARE MNGMENT TRAINING: CPT

## 2021-04-01 PROCEDURE — 97116 GAIT TRAINING THERAPY: CPT

## 2021-04-01 PROCEDURE — U0005: CPT

## 2021-04-01 PROCEDURE — 73560 X-RAY EXAM OF KNEE 1 OR 2: CPT

## 2021-04-01 PROCEDURE — 97166 OT EVAL MOD COMPLEX 45 MIN: CPT

## 2021-04-01 PROCEDURE — C1776: CPT

## 2021-04-01 PROCEDURE — 97161 PT EVAL LOW COMPLEX 20 MIN: CPT

## 2021-04-01 PROCEDURE — C1713: CPT

## 2021-04-01 PROCEDURE — C9803: CPT

## 2021-04-01 RX ORDER — OXYCODONE HYDROCHLORIDE 5 MG/1
1 TABLET ORAL
Qty: 42 | Refills: 0
Start: 2021-04-01 | End: 2021-04-07

## 2021-04-01 RX ORDER — PANTOPRAZOLE SODIUM 20 MG/1
1 TABLET, DELAYED RELEASE ORAL
Qty: 30 | Refills: 0
Start: 2021-04-01 | End: 2021-04-30

## 2021-04-01 RX ORDER — TRAMADOL HYDROCHLORIDE 50 MG/1
1 TABLET ORAL
Qty: 28 | Refills: 0
Start: 2021-04-01 | End: 2021-04-07

## 2021-04-01 RX ORDER — JNJ-78436735 50000000000 [PFU]/.5ML
0.5 SUSPENSION INTRAMUSCULAR ONCE
Refills: 0 | Status: COMPLETED | OUTPATIENT
Start: 2021-04-01 | End: 2021-04-01

## 2021-04-01 RX ORDER — ASPIRIN/CALCIUM CARB/MAGNESIUM 324 MG
1 TABLET ORAL
Qty: 80 | Refills: 0
Start: 2021-04-01 | End: 2021-05-10

## 2021-04-01 RX ADMIN — OXYCODONE HYDROCHLORIDE 5 MILLIGRAM(S): 5 TABLET ORAL at 10:14

## 2021-04-01 RX ADMIN — JNJ-78436735 0.5 MILLILITER(S): 50000000000 SUSPENSION INTRAMUSCULAR at 14:20

## 2021-04-01 RX ADMIN — Medication 325 MILLIGRAM(S): at 06:17

## 2021-04-01 RX ADMIN — Medication 975 MILLIGRAM(S): at 06:18

## 2021-04-01 RX ADMIN — LISINOPRIL 10 MILLIGRAM(S): 2.5 TABLET ORAL at 06:18

## 2021-04-01 RX ADMIN — PANTOPRAZOLE SODIUM 40 MILLIGRAM(S): 20 TABLET, DELAYED RELEASE ORAL at 06:17

## 2021-04-01 RX ADMIN — Medication 1 TABLET(S): at 11:10

## 2021-04-01 NOTE — PROGRESS NOTE ADULT - SUBJECTIVE AND OBJECTIVE BOX
ORTHO  Patient is a 75y old  Male who presents with a chief complaint of RTKA (31 Mar 2021 06:20)    Pt. resting without complaint    VS-  T(C): 36.8 (04-01-21 @ 04:39), Max: 37 (03-31-21 @ 16:12)  HR: 85 (04-01-21 @ 04:39) (80 - 100)  BP: 149/80 (04-01-21 @ 04:39) (122/62 - 158/90)  RR: 16 (04-01-21 @ 04:39) (16 - 16)  SpO2: 97% (04-01-21 @ 04:39) (96% - 99%)  Wt(kg): --    M.S. Alert  Extremity- Right knee- aqua cell dressing C/D/I  Neuro-              Motor- (+)Ankle- DF/PF              Sensation- grossly intact to light touch              Calves- soft, nontender                               14.3   13.64 )-----------( 279      ( 31 Mar 2021 06:45 )             42.9     03-31    140  |  107  |  20  ----------------------------<  152<H>  4.2   |  21<L>  |  1.06    Ca    8.4      31 Mar 2021 06:45

## 2021-04-01 NOTE — CHART NOTE - NSCHARTNOTEFT_GEN_A_CORE
Patient received covid19 vaccine on 4/1/2021    Emir Arias PA-C  Orthopedic Surgery Team  Team Pager #[phone]

## 2021-04-01 NOTE — PROGRESS NOTE ADULT - ASSESSMENT
Impression: Stable       Plan:   Continue present treatment                 Out of bed, ambulate, weight bearing as tolerated                  Physical therapy follow up                  Continue to monitor    Fitz Sahu PA-C  Orthopaedic Surgery  Team pager [phone]  [phone]

## 2021-04-16 ENCOUNTER — APPOINTMENT (OUTPATIENT)
Dept: ORTHOPEDIC SURGERY | Facility: CLINIC | Age: 76
End: 2021-04-16
Payer: MEDICARE

## 2021-04-16 ENCOUNTER — NON-APPOINTMENT (OUTPATIENT)
Age: 76
End: 2021-04-16

## 2021-04-16 PROCEDURE — 99024 POSTOP FOLLOW-UP VISIT: CPT

## 2021-04-16 PROCEDURE — 73560 X-RAY EXAM OF KNEE 1 OR 2: CPT | Mod: RT

## 2021-06-28 ENCOUNTER — FORM ENCOUNTER (OUTPATIENT)
Age: 76
End: 2021-06-28

## 2021-07-16 ENCOUNTER — APPOINTMENT (OUTPATIENT)
Dept: ORTHOPEDIC SURGERY | Facility: CLINIC | Age: 76
End: 2021-07-16
Payer: MEDICARE

## 2021-07-16 PROCEDURE — 73560 X-RAY EXAM OF KNEE 1 OR 2: CPT | Mod: RT

## 2021-07-16 PROCEDURE — 99214 OFFICE O/P EST MOD 30 MIN: CPT

## 2021-09-09 ENCOUNTER — APPOINTMENT (OUTPATIENT)
Dept: ORTHOPEDIC SURGERY | Facility: CLINIC | Age: 76
End: 2021-09-09
Payer: MEDICARE

## 2021-09-09 VITALS
DIASTOLIC BLOOD PRESSURE: 73 MMHG | BODY MASS INDEX: 25.77 KG/M2 | HEIGHT: 70 IN | WEIGHT: 180 LBS | HEART RATE: 65 BPM | SYSTOLIC BLOOD PRESSURE: 120 MMHG

## 2021-09-09 PROCEDURE — 99203 OFFICE O/P NEW LOW 30 MIN: CPT

## 2021-09-09 RX ORDER — RAMIPRIL 10 MG/1
10 CAPSULE ORAL
Refills: 0 | Status: ACTIVE | COMMUNITY

## 2021-09-09 RX ORDER — DICLOFENAC SODIUM 50 MG/1
50 TABLET, DELAYED RELEASE ORAL
Qty: 28 | Refills: 0 | Status: DISCONTINUED | COMMUNITY
Start: 2021-04-06 | End: 2021-09-09

## 2021-09-09 RX ORDER — DICLOFENAC SODIUM 50 MG/1
50 TABLET, DELAYED RELEASE ORAL
Qty: 28 | Refills: 0 | Status: DISCONTINUED | COMMUNITY
Start: 2021-04-21 | End: 2021-09-09

## 2021-09-09 RX ORDER — TRAMADOL HYDROCHLORIDE 50 MG/1
50 TABLET, COATED ORAL
Qty: 20 | Refills: 0 | Status: DISCONTINUED | COMMUNITY
Start: 2021-04-21 | End: 2021-09-09

## 2021-09-09 RX ORDER — TRAMADOL HYDROCHLORIDE 50 MG/1
50 TABLET, COATED ORAL
Qty: 30 | Refills: 0 | Status: DISCONTINUED | COMMUNITY
Start: 2021-04-06 | End: 2021-09-09

## 2021-09-23 ENCOUNTER — APPOINTMENT (OUTPATIENT)
Dept: ORTHOPEDIC SURGERY | Facility: CLINIC | Age: 76
End: 2021-09-23
Payer: MEDICARE

## 2021-09-23 DIAGNOSIS — M17.11 UNILATERAL PRIMARY OSTEOARTHRITIS, RIGHT KNEE: ICD-10-CM

## 2021-09-23 PROCEDURE — 26040 RELEASE PALM CONTRACTURE: CPT | Mod: F3,LT

## 2021-09-24 ENCOUNTER — NON-APPOINTMENT (OUTPATIENT)
Age: 76
End: 2021-09-24

## 2021-09-26 PROBLEM — M17.11 PRIMARY OSTEOARTHRITIS OF RIGHT KNEE: Status: ACTIVE | Noted: 2021-02-05

## 2021-09-27 ENCOUNTER — NON-APPOINTMENT (OUTPATIENT)
Age: 76
End: 2021-09-27

## 2021-10-01 ENCOUNTER — APPOINTMENT (OUTPATIENT)
Dept: ORTHOPEDIC SURGERY | Facility: CLINIC | Age: 76
End: 2021-10-01
Payer: MEDICARE

## 2021-10-01 PROCEDURE — 99214 OFFICE O/P EST MOD 30 MIN: CPT

## 2021-10-15 ENCOUNTER — APPOINTMENT (OUTPATIENT)
Dept: ORTHOPEDIC SURGERY | Facility: CLINIC | Age: 76
End: 2021-10-15
Payer: MEDICARE

## 2021-10-15 DIAGNOSIS — Z96.651 PRESENCE OF RIGHT ARTIFICIAL KNEE JOINT: ICD-10-CM

## 2021-10-15 PROCEDURE — 73560 X-RAY EXAM OF KNEE 1 OR 2: CPT | Mod: RT

## 2021-10-15 PROCEDURE — 99214 OFFICE O/P EST MOD 30 MIN: CPT

## 2021-10-21 ENCOUNTER — APPOINTMENT (OUTPATIENT)
Dept: ORTHOPEDIC SURGERY | Facility: CLINIC | Age: 76
End: 2021-10-21
Payer: MEDICARE

## 2021-10-21 DIAGNOSIS — M72.0 PALMAR FASCIAL FIBROMATOSIS [DUPUYTREN]: ICD-10-CM

## 2021-10-21 DIAGNOSIS — M19.042 PRIMARY OSTEOARTHRITIS, LEFT HAND: ICD-10-CM

## 2021-10-21 DIAGNOSIS — M19.041 PRIMARY OSTEOARTHRITIS, RIGHT HAND: ICD-10-CM

## 2021-10-21 PROCEDURE — 99213 OFFICE O/P EST LOW 20 MIN: CPT | Mod: 24

## 2022-02-03 ENCOUNTER — TRANSCRIPTION ENCOUNTER (OUTPATIENT)
Age: 77
End: 2022-02-03

## 2022-03-28 NOTE — OCCUPATIONAL THERAPY INITIAL EVALUATION ADULT - MANUAL MUSCLE TESTING RESULTS, REHAB EVAL
There are no Wet Read(s) to document. grossly assessed due to BUE and LLE 3+/5, unable to assess RLE secondary to TKR/grossly assessed due to

## 2022-04-01 ENCOUNTER — APPOINTMENT (OUTPATIENT)
Dept: ORTHOPEDIC SURGERY | Facility: CLINIC | Age: 77
End: 2022-04-01

## 2022-06-03 ENCOUNTER — NON-APPOINTMENT (OUTPATIENT)
Age: 77
End: 2022-06-03

## 2025-01-16 ENCOUNTER — NON-APPOINTMENT (OUTPATIENT)
Age: 80
End: 2025-01-16